# Patient Record
Sex: FEMALE | Race: WHITE | HISPANIC OR LATINO | Employment: UNEMPLOYED | URBAN - METROPOLITAN AREA
[De-identification: names, ages, dates, MRNs, and addresses within clinical notes are randomized per-mention and may not be internally consistent; named-entity substitution may affect disease eponyms.]

---

## 2021-01-08 ENCOUNTER — OFFICE VISIT (OUTPATIENT)
Dept: URGENT CARE | Facility: MEDICAL CENTER | Age: 36
End: 2021-01-08
Payer: COMMERCIAL

## 2021-01-08 VITALS
SYSTOLIC BLOOD PRESSURE: 130 MMHG | HEIGHT: 63 IN | RESPIRATION RATE: 18 BRPM | WEIGHT: 156 LBS | TEMPERATURE: 96.5 F | BODY MASS INDEX: 27.64 KG/M2 | DIASTOLIC BLOOD PRESSURE: 70 MMHG | HEART RATE: 88 BPM | OXYGEN SATURATION: 96 %

## 2021-01-08 DIAGNOSIS — Z02.4 DRIVER'S PERMIT PE (PHYSICAL EXAMINATION): Primary | ICD-10-CM

## 2021-01-08 NOTE — PROGRESS NOTES
[address]        NAME: Gely Webber is a 38 y o  female  : 1985    MRN: [id number]  DATE: 2021  TIME: 2:30 PM    Assessment and Plan   's permit PE (physical examination) [Z02 4]  1  's permit PE (physical examination)           Patient Instructions       Follow up with PCP in 3-5 days  Proceed to  ER if symptoms worsen  Chief Complaint     Chief Complaint   Patient presents with    Physical Exam     's License permit         History of Present Illness       See 's exam form  Negative history      Review of Systems   Review of Systems   All other systems reviewed and are negative  Current Medications     No current outpatient medications on file  Current Allergies     Allergies as of 2021    (No Known Allergies)            The following portions of the patient's history were reviewed and updated as appropriate: allergies, current medications, past family history, past medical history, past social history, past surgical history and problem list      History reviewed  No pertinent past medical history  History reviewed  No pertinent surgical history  History reviewed  No pertinent family history  Medications have been verified  Objective   /70   Pulse 88   Temp (!) 96 5 °F (35 8 °C) (Tympanic)   Resp 18   Ht 5' 3" (1 6 m)   Wt 70 8 kg (156 lb)   SpO2 96%   BMI 27 63 kg/m²   No LMP recorded  Physical Exam     Physical Exam  Constitutional:       Appearance: Normal appearance  She is normal weight  HENT:      Head: Normocephalic  Right Ear: Tympanic membrane normal       Left Ear: Tympanic membrane normal       Nose: Nose normal       Mouth/Throat:      Mouth: Mucous membranes are moist    Eyes:      Extraocular Movements: Extraocular movements intact  Conjunctiva/sclera: Conjunctivae normal       Pupils: Pupils are equal, round, and reactive to light     Neck:      Musculoskeletal: Normal range of motion  Cardiovascular:      Rate and Rhythm: Normal rate and regular rhythm  Pulmonary:      Effort: Pulmonary effort is normal       Breath sounds: Normal breath sounds  Abdominal:      General: Abdomen is flat  Musculoskeletal: Normal range of motion  Neurological:      General: No focal deficit present  Mental Status: She is alert and oriented to person, place, and time     Psychiatric:         Mood and Affect: Mood normal

## 2021-08-23 ENCOUNTER — OFFICE VISIT (OUTPATIENT)
Dept: FAMILY MEDICINE CLINIC | Facility: CLINIC | Age: 36
End: 2021-08-23

## 2021-08-23 VITALS
RESPIRATION RATE: 16 BRPM | OXYGEN SATURATION: 98 % | SYSTOLIC BLOOD PRESSURE: 120 MMHG | TEMPERATURE: 98 F | DIASTOLIC BLOOD PRESSURE: 60 MMHG | HEART RATE: 97 BPM | WEIGHT: 164 LBS | BODY MASS INDEX: 29.06 KG/M2 | HEIGHT: 63 IN

## 2021-08-23 DIAGNOSIS — Z11.4 SCREENING FOR HIV (HUMAN IMMUNODEFICIENCY VIRUS): ICD-10-CM

## 2021-08-23 DIAGNOSIS — Z11.3 SCREENING FOR STDS (SEXUALLY TRANSMITTED DISEASES): ICD-10-CM

## 2021-08-23 DIAGNOSIS — Z00.00 ANNUAL PHYSICAL EXAM: Primary | ICD-10-CM

## 2021-08-23 DIAGNOSIS — N30.90 CYSTITIS: ICD-10-CM

## 2021-08-23 DIAGNOSIS — R30.0 DYSURIA: ICD-10-CM

## 2021-08-23 DIAGNOSIS — Z11.59 NEED FOR HEPATITIS C SCREENING TEST: ICD-10-CM

## 2021-08-23 DIAGNOSIS — E66.3 OVERWEIGHT WITH BODY MASS INDEX (BMI) OF 29 TO 29.9 IN ADULT: ICD-10-CM

## 2021-08-23 LAB
SL AMB  POCT GLUCOSE, UA: NEGATIVE
SL AMB LEUKOCYTE ESTERASE,UA: POSITIVE
SL AMB POCT BILIRUBIN,UA: NEGATIVE
SL AMB POCT BLOOD,UA: POSITIVE
SL AMB POCT CLARITY,UA: CLEAR
SL AMB POCT COLOR,UA: YELLOW
SL AMB POCT KETONES,UA: NEGATIVE
SL AMB POCT NITRITE,UA: NEGATIVE
SL AMB POCT PH,UA: 5
SL AMB POCT SPECIFIC GRAVITY,UA: 1.02
SL AMB POCT URINE PROTEIN: NEGATIVE
SL AMB POCT UROBILINOGEN: NEGATIVE

## 2021-08-23 PROCEDURE — 87186 SC STD MICRODIL/AGAR DIL: CPT | Performed by: STUDENT IN AN ORGANIZED HEALTH CARE EDUCATION/TRAINING PROGRAM

## 2021-08-23 PROCEDURE — 87086 URINE CULTURE/COLONY COUNT: CPT | Performed by: STUDENT IN AN ORGANIZED HEALTH CARE EDUCATION/TRAINING PROGRAM

## 2021-08-23 PROCEDURE — 81002 URINALYSIS NONAUTO W/O SCOPE: CPT | Performed by: FAMILY MEDICINE

## 2021-08-23 PROCEDURE — 99385 PREV VISIT NEW AGE 18-39: CPT | Performed by: FAMILY MEDICINE

## 2021-08-23 PROCEDURE — 87077 CULTURE AEROBIC IDENTIFY: CPT | Performed by: STUDENT IN AN ORGANIZED HEALTH CARE EDUCATION/TRAINING PROGRAM

## 2021-08-23 RX ORDER — NITROFURANTOIN 25; 75 MG/1; MG/1
100 CAPSULE ORAL 2 TIMES DAILY
Qty: 10 CAPSULE | Refills: 0 | Status: SHIPPED | OUTPATIENT
Start: 2021-08-23 | End: 2021-08-28

## 2021-08-23 NOTE — PROGRESS NOTES
[facility]    NAME: Allison Quevedo  AGE: 39 y o  SEX: female  : 1985     DATE: 2021     Assessment and Plan:     Problem List Items Addressed This Visit        Genitourinary    Cystitis     Started 2 days ago   Has associated vaginal discharge, but unchanged from her regular  Denies pruritus, fever, chills, nausea, vomiting  No history of UTI  POCT urine dip positive for leukocytes and blood (LMP 2 days ago)  Will send macrobid 100 mg BID for 5 days  Follow up on Urine culture              Relevant Medications    nitrofurantoin (MACROBID) 100 mg capsule    Other Relevant Orders    Urine culture       Other    Annual physical exam - Primary     Immunizations; states she is thinking about the COVID accine  Denies family hx of DM, HTN, Denies personal/family history of colon cancer  Dental care: last seen 1 week ago  Sexually active, Monogamous, never STD testing  Denies smoking, Alcohol use, Illicit drug use  Mental health screenings: PHQ 2 score of zero, 6-7 hrs of sleep daily  Last well-woman check-Pap smear, normal, 6 months ago, next due   Last LMP  GC/chlamydia, HIV, hep C screening oredered           Relevant Orders    Ambulatory referral to Obstetrics / Gynecology      Other Visit Diagnoses     Overweight with body mass index (BMI) of 29 to 29 9 in adult        Relevant Orders    Lipid panel    TSH, 3rd generation with Free T4 reflex    Comprehensive metabolic panel    Screening for HIV (human immunodeficiency virus)        Relevant Orders    HIV 1/2 Antigen/Antibody (4th Generation) w Reflex [facility]    Need for hepatitis C screening test        Relevant Orders    Hepatitis C antibody    Screening for STDs (sexually transmitted diseases)        Relevant Orders    Chlamydia/GC amplified DNA by PCR          Immunizations and preventive care screenings were discussed with patient today  Appropriate education was printed on patient's after visit summary  Counseling:  Alcohol/drug use: discussed moderation in alcohol intake, the recommendations for healthy alcohol use, and avoidance of illicit drug use  Dental Health: discussed importance of regular tooth brushing, flossing, and dental visits  Injury prevention: discussed safety/seat belts, safety helmets, smoke detectors, carbon dioxide detectors, and smoking near bedding or upholstery  Sexual health: discussed sexually transmitted diseases, partner selection, use of condoms, avoidance of unintended pregnancy, and contraceptive alternatives  · Exercise: the importance of regular exercise/physical activity was discussed  Recommend exercise 3-5 times per week for at least 30 minutes  Return in 1 year (on 8/23/2022)  Chief Complaint:     Chief Complaint   Patient presents with    New Patient Visit      History of Present Illness:     Adult Annual Physical   Patient here for a comprehensive physical exam  The patient reports problems - Dysuria for 2 days  Diet and Physical Activity  · Diet/Nutrition: well balanced diet and limited fruits/vegetables  · Exercise: no formal exercise  Depression Screening  PHQ-9 Depression Screening    PHQ-9:   Frequency of the following problems over the past two weeks:      Little interest or pleasure in doing things: 0 - not at all  Feeling down, depressed, or hopeless: 0 - not at all  PHQ-2 Score: 0       General Health  · Sleep: gets 7-8 hours of sleep on average  · Hearing: normal - bilateral   · Vision: no vision problems  · Dental: regular dental visits  /GYN Health  · Last menstrual period: 8/21/2021  · Contraceptive method: none  · History of STDs?: no      Review of Systems:     Review of Systems   Constitutional: Negative for activity change and fever  HENT: Negative for congestion  Respiratory: Negative for cough, shortness of breath and wheezing  Cardiovascular: Negative for chest pain, palpitations and leg swelling  Gastrointestinal: Negative for abdominal pain, constipation, diarrhea, nausea and vomiting  Endocrine: Negative for polydipsia and polyuria  Genitourinary: Negative for difficulty urinating, dysuria, frequency and hematuria  Musculoskeletal: Negative for gait problem and neck pain  Skin: Negative for pallor  Neurological: Negative for dizziness  Psychiatric/Behavioral: Negative for behavioral problems  Past Medical History:     No past medical history on file  Past Surgical History:     No past surgical history on file  Social History:     Social History     Socioeconomic History    Marital status: /Civil Union     Spouse name: None    Number of children: None    Years of education: None    Highest education level: None   Occupational History    None   Tobacco Use    Smoking status: Never Smoker    Smokeless tobacco: Never Used   Substance and Sexual Activity    Alcohol use: Yes    Drug use: Never    Sexual activity: None   Other Topics Concern    None   Social History Narrative    None     Social Determinants of Health     Financial Resource Strain:     Difficulty of Paying Living Expenses:    Food Insecurity:     Worried About Running Out of Food in the Last Year:     [address] in the Last Year:    Transportation Needs:     Lack of Transportation (Medical):      Lack of Transportation (Non-Medical):    Physical Activity:     Days of Exercise per Week:     Minutes of Exercise per Session:    Stress:     Feeling of Stress :    Social Connections:     Frequency of Communication with Friends and Family:     Frequency of Social Gatherings with Friends and Family:     Attends [employer]oration[employer] Services:     Active Member of Clubs or Organizations:     Attends Club or Organization Meetings:     Marital Status:    Intimate Partner Violence:     Fear of Current or Ex-Partner:     Emotionally Abused:     Physically Abused:     Sexually Abused:       Family History:     No family history on file  Current Medications:     Current Outpatient Medications   Medication Sig Dispense Refill    nitrofurantoin (MACROBID) 100 mg capsule Take 1 capsule (100 mg total) by mouth 2 (two) times a day for 5 days 10 capsule 0     No current facility-administered medications for this visit  Allergies:     No Known Allergies   Physical Exam:     /60 (BP Location: Left arm, Patient Position: Sitting, Cuff Size: Large)   Pulse 97   Temp 98 °F (36 7 °C) (Temporal)   Resp 16   Ht 5' 3" (1 6 m)   Wt 74 4 kg (164 lb)   LMP  (LMP Unknown)   SpO2 98%   BMI 29 05 kg/m²     Physical Exam  Vitals and nursing note reviewed  Constitutional:       General: She is not in acute distress  Appearance: She is well-developed  HENT:      Head: Normocephalic and atraumatic  Eyes:      Conjunctiva/sclera: Conjunctivae normal    Cardiovascular:      Rate and Rhythm: Normal rate and regular rhythm  Heart sounds: No murmur heard  Pulmonary:      Effort: Pulmonary effort is normal  No respiratory distress  Breath sounds: Normal breath sounds  Abdominal:      Palpations: Abdomen is soft  Tenderness: There is no abdominal tenderness  Musculoskeletal:      Cervical back: Neck supple  Skin:     General: Skin is warm and dry  Neurological:      Mental Status: She is alert and oriented to person, place, and time     Psychiatric:         Behavior: Behavior normal           MD Samantha Aikendamian

## 2021-08-23 NOTE — ASSESSMENT & PLAN NOTE
Started 2 days ago   Has associated vaginal discharge, but unchanged from her regular  Denies pruritus, fever, chills, nausea, vomiting  No history of UTI  POCT urine dip positive for leukocytes and blood (LMP 2 days ago)  Will send macrobid 100 mg BID for 5 days  Follow up on Urine culture

## 2021-08-23 NOTE — ASSESSMENT & PLAN NOTE
Immunizations; states she is thinking about the COVID accine  Denies family hx of DM, HTN, Denies personal/family history of colon cancer  Dental care: last seen 1 week ago    Sexually active, Monogamous, never STD testing  Denies smoking, Alcohol use, Illicit drug use  Mental health screenings: PHQ 2 score of zero, 6-7 hrs of sleep daily  Last well-woman check-Pap smear, normal, 6 months ago, next due 2024  Last LMP  GC/chlamydia, HIV, hep C screening oredered

## 2021-08-23 NOTE — PATIENT INSTRUCTIONS

## 2021-08-25 LAB
BACTERIA UR CULT: ABNORMAL
BACTERIA UR CULT: ABNORMAL

## 2021-09-16 ENCOUNTER — OFFICE VISIT (OUTPATIENT)
Dept: OBGYN CLINIC | Facility: CLINIC | Age: 36
End: 2021-09-16

## 2021-09-16 VITALS
DIASTOLIC BLOOD PRESSURE: 79 MMHG | WEIGHT: 163 LBS | SYSTOLIC BLOOD PRESSURE: 126 MMHG | HEIGHT: 63 IN | BODY MASS INDEX: 28.88 KG/M2 | HEART RATE: 96 BPM

## 2021-09-16 DIAGNOSIS — Z00.00 ANNUAL PHYSICAL EXAM: ICD-10-CM

## 2021-09-16 PROCEDURE — G0145 SCR C/V CYTO,THINLAYER,RESCR: HCPCS

## 2021-09-16 PROCEDURE — 99214 OFFICE O/P EST MOD 30 MIN: CPT | Performed by: OBSTETRICS & GYNECOLOGY

## 2021-09-16 PROCEDURE — G0476 HPV COMBO ASSAY CA SCREEN: HCPCS

## 2021-09-16 NOTE — PROGRESS NOTES
Soumya Manzo is a 39 y o  female who presents for annual well woman exam  Periods are regular every 28-30 days, lasting 5 days  She was seen by her PCP on 8/23/21 for a physical and was referred for a gynecologic exam  She was found to have cystitis at her visit with PCP and was treated with Camella Goltz; she no longer has any symptoms  GYN:  · No vaginal discharge, labial erythema or lesions, dyspareunia  · Menses are regular, q 30 days, lasting 5-6 days  · Contraception: s/p tubal ligation  · Patient is  sexually active with one male partner  · Gynecologic surgery: b/l salpingectomy     :  · Denies  dysuria, urinary frequency or urgency  · Denies hematuria, flank pain, incontinence  Breast:  · No breast mass, skin changes, dimpling, reddening, nipple retraction  · No breast discharge  · Has not had any mammograms yet  Will start at [address] of age     General:  · Diet: balanced per pt  · Exercise: no formal exercise  · Tobacco use: never    Screening:  · Cervical cancer: last pap smear 6 months ago in [place]  Results were normal per pt  Done today  · Colon cancer: no colonoscopies done  Denies family or personal hx of colon cancer  · STD screening: ordered by PCP, pt will obtain labs    Review of Systems  Eyes: negative  Ears, nose, mouth, throat, and face: negative  Respiratory: negative  Cardiovascular: negative  Gastrointestinal: negative  Integument/breast: negative  Neurological: negative  Behavioral/Psych: negative      Objective      /79   Pulse 96   Ht 5' 3" (1 6 m)   Wt 73 9 kg (163 lb)   LMP 09/09/2021   BMI 28 87 kg/m²     Physical Exam  Constitutional:       Appearance: Normal appearance  HENT:      Head: Normocephalic  Mouth/Throat:      Mouth: Mucous membranes are moist    Eyes:      Conjunctiva/sclera: Conjunctivae normal    Cardiovascular:      Rate and Rhythm: Normal rate and regular rhythm  Pulses: Normal pulses        Heart sounds: Normal heart sounds  Pulmonary:      Effort: Pulmonary effort is normal  No respiratory distress  Breath sounds: Normal breath sounds  No wheezing or rales  Abdominal:      Palpations: Abdomen is soft  Tenderness: There is no abdominal tenderness  Genitourinary:     Vagina: No vaginal discharge  Comments: Normal external genitalia  Normal appearing cervix  Normal cervical discharge in vaginal vault  No cervical motion or adnexal tenderness  Skin:     General: Skin is warm and dry  Capillary Refill: Capillary refill takes less than 2 seconds  Neurological:      Mental Status: She is alert and oriented to person, place, and time  Mental status is at baseline  Psychiatric:         Mood and Affect: Mood normal          Behavior: Behavior normal                  Assessment     Francine Romero is a 39 y o  [id number] with normal gynecologic exam      Plan     30-39  1  Routine well woman exam done today  2   Pap and HPV:Pap with HPV was done today  Current ASCCP Guidelines reviewed  3   The patient accepted STD testing  chlamydia, gonorrhea, HIV, HPV and Hep C testing performed  Safe sex practices have been discussed  4  The patient is sexually active  S/p tubal ligation    5  The following were reviewed in today's visit: mammography screening ordered, STD testing, adequate intake of calcium and vitamin D, exercise and healthy diet    6  Patient to return to office in 12 months for annual exam

## 2021-09-20 LAB
HPV HR 12 DNA CVX QL NAA+PROBE: NEGATIVE
HPV16 DNA CVX QL NAA+PROBE: NEGATIVE
HPV18 DNA CVX QL NAA+PROBE: NEGATIVE

## 2021-09-22 LAB
LAB AP GYN PRIMARY INTERPRETATION: NORMAL
Lab: NORMAL

## 2022-09-29 ENCOUNTER — TELEPHONE (OUTPATIENT)
Dept: OBGYN CLINIC | Facility: CLINIC | Age: 37
End: 2022-09-29

## 2022-10-11 ENCOUNTER — TELEPHONE (OUTPATIENT)
Dept: OBGYN CLINIC | Facility: CLINIC | Age: 37
End: 2022-10-11

## 2023-02-17 ENCOUNTER — VBI (OUTPATIENT)
Dept: ADMINISTRATIVE | Facility: OTHER | Age: 38
End: 2023-02-17

## 2023-03-09 ENCOUNTER — OFFICE VISIT (OUTPATIENT)
Dept: FAMILY MEDICINE CLINIC | Facility: CLINIC | Age: 38
End: 2023-03-09

## 2023-03-09 VITALS
OXYGEN SATURATION: 98 % | DIASTOLIC BLOOD PRESSURE: 80 MMHG | SYSTOLIC BLOOD PRESSURE: 122 MMHG | WEIGHT: 167 LBS | RESPIRATION RATE: 16 BRPM | HEART RATE: 74 BPM | TEMPERATURE: 98.7 F | BODY MASS INDEX: 29.59 KG/M2 | HEIGHT: 63 IN

## 2023-03-09 DIAGNOSIS — K21.9 GASTROESOPHAGEAL REFLUX DISEASE WITHOUT ESOPHAGITIS: Primary | ICD-10-CM

## 2023-03-09 DIAGNOSIS — Z23 ENCOUNTER FOR VACCINATION: ICD-10-CM

## 2023-03-09 DIAGNOSIS — R14.0 BLOATING: ICD-10-CM

## 2023-03-09 PROBLEM — N30.90 CYSTITIS: Status: RESOLVED | Noted: 2021-08-23 | Resolved: 2023-03-09

## 2023-03-09 RX ORDER — FAMOTIDINE 20 MG/1
20 TABLET, FILM COATED ORAL 2 TIMES DAILY
Qty: 60 TABLET | Refills: 0 | Status: SHIPPED | OUTPATIENT
Start: 2023-03-09

## 2023-03-09 NOTE — ASSESSMENT & PLAN NOTE
Reviewed the causes of heartburn  Discussed importance of diet and lifestyle modifications to control GERD symptoms  Avoid things that worsen heartburn (ex: caffeine, tomato based products, spicy foods, tobacco, alcohol, obesity, tight fitting clothing  Discussed importance of eating small, frequent meals instead of large meals  Elevate head of the bed and do not lay down for 2-3 hours following a meal    Will obtain H-pylori test   Start famotidine 20 mg BID  Follow up in 4 weeks; but call in 2 weeks if symptoms unresolving, so that we can switch to a PPI   For now, patient does not want omeprazole but will be willing to use other PPIs

## 2023-03-09 NOTE — PATIENT INSTRUCTIONS
GERD (Gastroesophageal Reflux Disease)   WHAT YOU NEED TO KNOW:   Gastroesophageal reflux disease (GERD) is reflux that happens more than 2 times a week for a few weeks  Reflux means acid and food in your stomach back up into your esophagus  GERD can cause other health problems over time if it is not treated  DISCHARGE INSTRUCTIONS:   Call your local emergency number (911 in the [address] [facility] [facility]) if:   You have severe chest pain and sudden trouble breathing  Return to the emergency department if:   You have trouble breathing after you vomit  You have trouble swallowing, or pain with swallowing  Your bowel movements are black, bloody, or tarry-looking  Your vomit looks like coffee grounds or has blood in it  Call your doctor or gastroenterologist if:   You feel full and cannot burp or vomit  You vomit large amounts, or you vomit often  You are losing weight without trying  Your symptoms get worse or do not improve with treatment  You have questions or concerns about your condition or care  Medicines:   Medicines  are used to decrease stomach acid  Medicine may also be used to help your lower esophageal sphincter and stomach contract (tighten) more  Take your medicine as directed  Contact your healthcare provider if you think your medicine is not helping or if you have side effects  Tell your provider if you are allergic to any medicine  Keep a list of the medicines, vitamins, and herbs you take  Include the amounts, and when and why you take them  Bring the list or the pill bottles to follow-up visits  Carry your medicine list with you in case of an emergency  Manage GERD:       Do not have foods or drinks that may increase heartburn  These include chocolate, peppermint, fried or fatty foods, drinks that contain caffeine, or carbonated drinks (soda)  Other foods include spicy foods, onions, tomatoes, and tomato-based foods   Do not have foods or drinks that can irritate your esophagus, such as citrus fruits, juices, and alcohol  Do not eat large meals  When you eat a lot of food at one time, your stomach needs more acid to digest it  Eat 6 small meals each day instead of 3 large ones, and eat slowly  Do not eat meals 2 to 3 hours before bedtime  Elevate the head of your bed  Place 6-inch blocks under the head of your bed frame  You may also use more than one pillow under your head and shoulders while you sleep  Maintain a healthy weight  If you are overweight, weight loss may help relieve symptoms of GERD  Do not smoke  Smoking weakens the lower esophageal sphincter and increases the risk of GERD  Ask your healthcare provider for information if you currently smoke and need help to quit  E-cigarettes or smokeless tobacco still contain nicotine  Talk to your healthcare provider before you use these products  Do not put pressure on your abdomen  Pressure pushes acid up into your esophagus  Do not wear clothing that is tight around your waist  Do not bend over  Bend at the knees if you need to pick something up  Follow up with your doctor or gastroenterologist as directed:  Write down your questions so you remember to ask them during your visits  © Copyright [employer] Raines 2022 Information is for End User's use only and may not be sold, redistributed or otherwise used for commercial purposes  The above information is an  only  It is not intended as medical advice for individual conditions or treatments  Talk to your doctor, nurse or pharmacist before following any medical regimen to see if it is safe and effective for you

## 2023-03-09 NOTE — PROGRESS NOTES
Name: Alexis Olivier YOB: 1985      MRN: [id number]  Encounter Provider: Alf Hanson MD  Encounter Date: 3/9/2023   Encounter department: [address] [place] [address]  Gastroesophageal reflux disease without esophagitis  Assessment & Plan:  Reviewed the causes of heartburn  Discussed importance of diet and lifestyle modifications to control GERD symptoms  Avoid things that worsen heartburn (ex: caffeine, tomato based products, spicy foods, tobacco, alcohol, obesity, tight fitting clothing  Discussed importance of eating small, frequent meals instead of large meals  Elevate head of the bed and do not lay down for 2-3 hours following a meal    Will obtain H-pylori test   Start famotidine 20 mg BID  Follow up in 4 weeks; but call in 2 weeks if symptoms unresolving, so that we can switch to a PPI  For now, patient does not want omeprazole but will be willing to use other PPIs    Orders:  -     famotidine (PEPCID) 20 mg tablet; Take 1 tablet (20 mg total) by mouth 2 (two) times a day    2  Encounter for vaccination  -     Tdap Vaccine greater than or equal to 6yo    3  Bloating  -     H  pylori antigen, stool; Future      Subjective     HPI     Eyad Medrano is a 27-year-old female with no significant past medical history, who presents today with concerns for acid reflux  She reports epigastric pain ongoing for several months now, burning in nature, which radiates to the back and up to the throat, pain after eating  Worse at night and with beans, fried foods, and recently notices with her green juices in the mornings  Has occasional nausea and feeling of bloating/fullness all the time  Eats tomato based foods, drinks 1 cup of coffee/day  Has occurred in the past and was treated for gastritis in [place] before moving to [place] 3 years ago  She was using omeprazole at the time occasionally      Review of Systems  Constitutional: Negative for activity change  Respiratory: Negative for shortness of breath  Cardiovascular: Negative for chest pain, palpitations and leg swelling  Gastrointestinal: positive for abdominal pain, negative for constipation  Urinary symptoms: Negative dysuria, urinary frequency or urgency  Neurological: Negative for dizziness  Psychiatric/Behavioral: Negative for behavioral problems, dysphoric mood, hallucinations, sleep disturbance and suicidal ideas  The patient is not nervous/anxious  No past medical history on file  Past Surgical History:   Procedure Laterality Date   • TUBAL LIGATION       Family History   Problem Relation Age of Onset   • No Known Problems Mother    • No Known Problems Father    • No Known Problems Sister    • No Known Problems Brother    • No Known Problems Daughter    • No Known Problems Son      Social History     Socioeconomic History   • Marital status: /Civil Union     Spouse name: None   • Number of children: None   • Years of education: None   • Highest education level: None   Occupational History   • None   Tobacco Use   • Smoking status: Never   • Smokeless tobacco: Never   Vaping Use   • Vaping Use: Never used   Substance and Sexual Activity   • Alcohol use: Yes   • Drug use: Never   • Sexual activity: Yes     Partners: Male     Birth control/protection: None   Other Topics Concern   • None   Social History Narrative   • None     Social Determinants of Health     Financial Resource Strain: Low Risk    • Difficulty of Paying Living Expenses: Not hard at all   Food Insecurity: No Food Insecurity   • Worried About Running Out of Food in the Last Year: Never true   • Ran Out of Food in the Last Year: Never true   Transportation Needs: No Transportation Needs   • Lack of Transportation (Medical): No   • Lack of Transportation (Non-Medical):  No   Physical Activity: Not on file   Stress: Not on file   Social Connections: Not on file   Intimate Partner Violence: Not on file Housing Stability: Not on file     No current outpatient medications on file prior to visit  No Known Allergies  There is no immunization history for the selected administration types on file for this patient      Objective     /80 (BP Location: Right arm, Patient Position: Sitting, Cuff Size: Standard)   Pulse 74   Temp 98 7 °F (37 1 °C) (Temporal)   Resp 16   Ht 5' 3" (1 6 m)   Wt 75 8 kg (167 lb)   LMP 03/03/2023 (Approximate)   SpO2 98%   Breastfeeding No   BMI 29 58 kg/m²     Physical Exam  Micah Thrasher MD

## 2023-03-13 ENCOUNTER — LAB (OUTPATIENT)
Dept: LAB | Facility: HOSPITAL | Age: 38
End: 2023-03-13

## 2023-03-13 DIAGNOSIS — R14.0 BLOATING: ICD-10-CM

## 2023-03-14 LAB — H PYLORI AG STL QL IA: NEGATIVE

## 2023-04-10 ENCOUNTER — APPOINTMENT (OUTPATIENT)
Dept: URGENT CARE | Age: 38
End: 2023-04-10

## 2023-04-10 ENCOUNTER — APPOINTMENT (OUTPATIENT)
Dept: LAB | Age: 38
End: 2023-04-10

## 2023-04-10 DIAGNOSIS — Z02.1 PHYSICAL EXAM, PRE-EMPLOYMENT: ICD-10-CM

## 2023-04-10 DIAGNOSIS — Z02.1 PHYSICAL EXAM, PRE-EMPLOYMENT: Primary | ICD-10-CM

## 2023-04-12 LAB
GAMMA INTERFERON BACKGROUND BLD IA-ACNC: 0.08 IU/ML
M TB IFN-G BLD-IMP: POSITIVE
M TB IFN-G CD4+ BCKGRND COR BLD-ACNC: 0.38 IU/ML
M TB IFN-G CD4+ BCKGRND COR BLD-ACNC: 0.44 IU/ML
MITOGEN IGNF BCKGRD COR BLD-ACNC: 8.09 IU/ML

## 2023-04-14 PROBLEM — J30.9 ALLERGIC RHINITIS: Status: ACTIVE | Noted: 2023-04-14

## 2023-05-01 ENCOUNTER — OFFICE VISIT (OUTPATIENT)
Dept: GASTROENTEROLOGY | Facility: CLINIC | Age: 38
End: 2023-05-01

## 2023-05-01 VITALS
HEART RATE: 81 BPM | DIASTOLIC BLOOD PRESSURE: 55 MMHG | TEMPERATURE: 98 F | WEIGHT: 167.2 LBS | HEIGHT: 64 IN | OXYGEN SATURATION: 98 % | BODY MASS INDEX: 28.54 KG/M2 | SYSTOLIC BLOOD PRESSURE: 116 MMHG

## 2023-05-01 DIAGNOSIS — H93.11 TINNITUS OF RIGHT EAR: ICD-10-CM

## 2023-05-01 DIAGNOSIS — K21.9 GASTROESOPHAGEAL REFLUX DISEASE WITHOUT ESOPHAGITIS: Primary | ICD-10-CM

## 2023-05-01 DIAGNOSIS — K59.09 OTHER CONSTIPATION: ICD-10-CM

## 2023-05-01 DIAGNOSIS — R10.13 EPIGASTRIC PAIN: ICD-10-CM

## 2023-05-01 RX ORDER — POLYETHYLENE GLYCOL 3350 17 G/17G
17 POWDER, FOR SOLUTION ORAL DAILY
Qty: 30 EACH | Refills: 1 | Status: SHIPPED | OUTPATIENT
Start: 2023-05-01 | End: 2023-05-31

## 2023-05-01 RX ORDER — PANTOPRAZOLE SODIUM 40 MG/1
40 TABLET, DELAYED RELEASE ORAL 2 TIMES DAILY
Qty: 120 TABLET | Refills: 0 | Status: SHIPPED | OUTPATIENT
Start: 2023-05-01 | End: 2023-06-30

## 2023-05-01 NOTE — PROGRESS NOTES
[facility] - Outpatient Consultation  Michael Freire 44 y o  female MRN: [id number]  Encounter: [id number]        ASSESSMENT AND PLAN     1  Gastroesophageal reflux disease without esophagitis  2  Epigastric pain  We will obtain EGD given her worsening symptoms, not responsive to PPI daily  We will also increase the dose of PPI to twice daily and assess response  Recommended that if the twice daily PPI does help she should call us and cancel the appointment for EGD   - CBC and Platelet; Future  - TSH, 3rd generation with Free T4 reflex; Future  - Comprehensive metabolic panel; Future  - US right upper quadrant; Future  - pantoprazole (PROTONIX) 40 mg tablet; Take 1 tablet (40 mg total) by mouth 2 (two) times a day  Dispense: 120 tablet; Refill: 0  - EGD; Future    3  Other constipation  Will recommend starting MiraLAX daily, she has not tried any medications for this as of yet  Will assess response during a follow-up visit  - CBC and Platelet; Future  - TSH, 3rd generation with Free T4 reflex; Future  - Comprehensive metabolic panel; Future  - Start taking fiber supplementation (Metamucil, Citrucel, Benefiber, etc)  Please mix one heaping tablespoon with an 8 ounce glass of water and drink every morning  This can be increased to twice per day if needed  Fiber tablets and gummies will also work  The goal is to have regular, formed, and easy-to-pass bowel movements  The effect is gradual, so please use it consistently for at least 2 weeks  Stay well-hydrated to avoid constipation  FUP after EGD      Orders Placed This Encounter   Procedures    US right upper quadrant    CBC and Platelet    TSH, 3rd generation with Free T4 reflex    Comprehensive metabolic panel    EGD       HISTORY OF PRESENT ILLNESS     HPI     28-year-old female with past medical history including but not limited to chronic GERD symptoms, constipation who presents today upon referral from primary care provider for ongoing symptoms  Reports that she has been dealing with on and off abdominal pain, reflux for about 10 years  Previously was seen in [place] by GI team, had an endoscopy which she reports had stomach ulcers  Since then she has followed with GI on and off  Last saw GI team at [facility] about 4 years ago and she was maintained on omeprazole which was helping only minimally  She was recently seen by Joseph [facility]enzie primary care provider and was started on pantoprazole after being tested for stool antigen for H  pylori which was negative  Reports that the pantoprazole has been helping her symptoms better than the omeprazole  She mainly describes her symptoms as epigastric pain which does not change in relation to the food intake  Present throughout the day but mostly in the morning after waking up  Has tried some home remedy and dietary modifications which helped minimally  Along with this she also complains of constipation ongoing for very long time  Currently she describes her bowel habits as hard, with straining every 3 to 4 days  She denies any smoking, alcohol use  Denies any family history concerning for GI related disorders  REVIEW OF SYSTEMS     CONSTITUTIONAL: Denies any fever, chills, rigors, and weight loss  HEENT: No earache or tinnitus  Denies hearing loss or visual disturbances  CARDIOVASCULAR: No chest pain or palpitations  RESPIRATORY: Denies any cough, hemoptysis, shortness of breath or dyspnea on exertion  GASTROINTESTINAL: As noted in the History of Present Illness  GENITOURINARY: No problems with urination  Denies any hematuria or dysuria  NEUROLOGIC: No dizziness or vertigo, denies headaches  MUSCULOSKELETAL: Denies any muscle or joint pain  SKIN: Denies skin rashes or itching  ENDOCRINE: Denies excessive thirst  Denies intolerance to heat or cold  PSYCHOSOCIAL: Denies depression or anxiety  Denies any recent memory loss  "      Historical information   History reviewed  No pertinent past medical history  Past Surgical History:   Procedure Laterality Date    TUBAL LIGATION       Social History   Social History     Substance and Sexual Activity   Alcohol Use Yes     Social History     Substance and Sexual Activity   Drug Use Never     Social History     Tobacco Use   Smoking Status Never   Smokeless Tobacco Never     Family History   Problem Relation Age of Onset    No Known Problems Mother     No Known Problems Father     No Known Problems Sister     No Known Problems Brother     No Known Problems Daughter     No Known Problems Son        Allergies       Current Outpatient Medications:     clotrimazole (LOTRIMIN) 1 % cream    fexofenadine (ALLEGRA) 180 MG tablet    fluticasone (FLONASE) 50 mcg/act nasal spray    pantoprazole (PROTONIX) 40 mg tablet    polyethylene glycol (MIRALAX) 17 g packet    No Known Allergies        Objective assessment       Blood pressure 116/55, pulse 81, temperature 98 °F (36 7 °C), temperature source Tympanic, height 5' 4\" (1 626 m), weight 75 8 kg (167 lb 3 2 oz), last menstrual period 04/05/2023, SpO2 98 %, not currently breastfeeding  Body mass index is 28 7 kg/m²  PHYSICAL EXAM:         General Appearance:   Alert, cooperative, no distress   HEENT:   Normocephalic     Neck:  symmetrical   Lungs:   Breathing unlabored, able to speak in full sentences    Heart[de-identified]   Regular rate    Abdomen:   Non distended   Genitalia:   Deferred    Rectal:   Deferred    Extremities:  No cyanosis, or edema    Pulses:  Strong   Skin:  No jaundice, rashes, or lesions    Lymph nodes:  No palpable cervical lymphadenopathy        Lab Results:      No visits with results within 1 Day(s) from this visit     Latest known visit with results is:   Appointment on 04/10/2023   Component Date Value    QFT Nil 04/10/2023 0 08     QFT TB1-NIL 04/10/2023 0 38     QFT TB2-NIL 04/10/2023 0 44     QFT Mitogen-NIL " 04/10/2023 8 09     QFT Final Interpretation 04/10/2023 Positive (A)          Radiology Results:      No results found        Camille Thrasher MD  [facility] Fellow

## 2023-05-01 NOTE — PATIENT INSTRUCTIONS
Scheduled date of egd (as of today) 06/29/2023  Physician performing: Dr Jozef Sifuentes  Location of procedure:  [facility]  Instructions given to patient:  EGD  Clearances: n/a

## 2023-05-08 ENCOUNTER — TELEPHONE (OUTPATIENT)
Dept: OTHER | Facility: OTHER | Age: 38
End: 2023-05-08

## 2023-05-08 NOTE — TELEPHONE ENCOUNTER
Patient called stating she went to her pharmacy to  her prescriptions, but she was told that her Medicare did not approve her medications  Patient is wondering if an alternative could be sent  Please call patient back to discuss

## 2023-05-09 ENCOUNTER — LAB (OUTPATIENT)
Dept: LAB | Facility: HOSPITAL | Age: 38
End: 2023-05-09

## 2023-05-09 ENCOUNTER — HOSPITAL ENCOUNTER (OUTPATIENT)
Dept: ULTRASOUND IMAGING | Facility: HOSPITAL | Age: 38
Discharge: HOME | End: 2023-05-09

## 2023-05-09 DIAGNOSIS — K59.09 OTHER CONSTIPATION: Primary | ICD-10-CM

## 2023-05-09 DIAGNOSIS — R10.13 EPIGASTRIC PAIN: ICD-10-CM

## 2023-05-09 DIAGNOSIS — K21.9 GASTROESOPHAGEAL REFLUX DISEASE WITHOUT ESOPHAGITIS: ICD-10-CM

## 2023-05-09 DIAGNOSIS — J30.9 ALLERGIC RHINITIS, UNSPECIFIED SEASONALITY, UNSPECIFIED TRIGGER: ICD-10-CM

## 2023-05-09 DIAGNOSIS — K59.09 OTHER CONSTIPATION: ICD-10-CM

## 2023-05-09 LAB
ALBUMIN SERPL BCP-MCNC: 4.3 G/DL (ref 3.5–5)
ALP SERPL-CCNC: 42 U/L (ref 34–104)
ALT SERPL W P-5'-P-CCNC: 22 U/L (ref 7–52)
ANION GAP SERPL CALCULATED.3IONS-SCNC: 8 MMOL/L (ref 4–13)
AST SERPL W P-5'-P-CCNC: 21 U/L (ref 13–39)
BILIRUB SERPL-MCNC: 0.48 MG/DL (ref 0.2–1)
BUN SERPL-MCNC: 13 MG/DL (ref 5–25)
CALCIUM SERPL-MCNC: 9 MG/DL (ref 8.4–10.2)
CHLORIDE SERPL-SCNC: 105 MMOL/L (ref 96–108)
CO2 SERPL-SCNC: 26 MMOL/L (ref 21–32)
CREAT SERPL-MCNC: 0.81 MG/DL (ref 0.6–1.3)
ERYTHROCYTE [DISTWIDTH] IN BLOOD BY AUTOMATED COUNT: 14.1 % (ref 11.6–15.1)
GFR SERPL CREATININE-BSD FRML MDRD: 91 ML/MIN/1.73SQ M
GLUCOSE P FAST SERPL-MCNC: 98 MG/DL (ref 65–99)
HCT VFR BLD AUTO: 37.9 % (ref 34.8–46.1)
HGB BLD-MCNC: 11.5 G/DL (ref 11.5–15.4)
MCH RBC QN AUTO: 26.2 PG (ref 26.8–34.3)
MCHC RBC AUTO-ENTMCNC: 30.3 G/DL (ref 31.4–37.4)
MCV RBC AUTO: 86 FL (ref 82–98)
PLATELET # BLD AUTO: 228 THOUSANDS/UL (ref 149–390)
PMV BLD AUTO: 11.7 FL (ref 8.9–12.7)
POTASSIUM SERPL-SCNC: 4 MMOL/L (ref 3.5–5.3)
PROT SERPL-MCNC: 7.1 G/DL (ref 6.4–8.4)
RBC # BLD AUTO: 4.39 MILLION/UL (ref 3.81–5.12)
SODIUM SERPL-SCNC: 139 MMOL/L (ref 135–147)
TSH SERPL DL<=0.05 MIU/L-ACNC: 1.89 UIU/ML (ref 0.45–4.5)
WBC # BLD AUTO: 6.93 THOUSAND/UL (ref 4.31–10.16)

## 2023-05-09 RX ORDER — POLYETHYLENE GLYCOL 3350 17 G/17G
17 POWDER, FOR SOLUTION ORAL DAILY
Qty: 255 G | Refills: 2 | Status: SHIPPED | OUTPATIENT
Start: 2023-05-09

## 2023-05-09 NOTE — TELEPHONE ENCOUNTER
Called pt and left vm informing pt of what was discussed with pharmacy and relayed message about pantoprazole being able to be picked up today, however, miralax will need a new order for bottle to be covered  Provided our number and instructed to call for me for further assistance

## 2023-05-09 NOTE — TELEPHONE ENCOUNTER
Spoke with patient and she stated medication not covered by medical assistance insurance  I informed pt I will contact pharmacy to further discuss

## 2023-05-09 NOTE — TELEPHONE ENCOUNTER
Returned phone call to pt and I explained to her medications are ready to be picked up  Explained miralax had to be reordered and should be ready to  as well  Pt verbalized understanding

## 2023-05-09 NOTE — TELEPHONE ENCOUNTER
Contacted pharmacy regarding pt's medication being rejected by insurance and was able to connect with pharmacy  Per pharmacist, the pantoprazole was too soon to be filled at the time and miralax packet not covered because insurance has bottle as formulary medication  The pharmacy informed pt can  pantoprazole, however the miralax needs  to have new order submitted for bottle instead of packet

## 2023-05-12 LAB

## 2023-05-12 NOTE — RESULT ENCOUNTER NOTE
I called and spoke to the spouse regarding the results of CBC, CMP, TSH  He said he can take the msg and relay the info to the patient  Discussed that everything appered normal  Encouraged to obtain endoscopy which is already scheduled  He expressed gratitude and understanding

## 2023-06-07 DIAGNOSIS — R76.12 POSITIVE QUANTIFERON-TB GOLD TEST: Primary | ICD-10-CM

## 2023-06-17 DIAGNOSIS — R76.12 POSITIVE QUANTIFERON-TB GOLD TEST: Primary | ICD-10-CM

## 2023-06-29 ENCOUNTER — HOSPITAL ENCOUNTER (OUTPATIENT)
Dept: GASTROENTEROLOGY | Facility: HOSPITAL | Age: 38
Setting detail: OUTPATIENT SURGERY
End: 2023-06-29
Attending: INTERNAL MEDICINE | Admitting: INTERNAL MEDICINE
Payer: COMMERCIAL

## 2023-06-29 ENCOUNTER — ANESTHESIA (OUTPATIENT)
Dept: GASTROENTEROLOGY | Facility: HOSPITAL | Age: 38
End: 2023-06-29

## 2023-06-29 ENCOUNTER — ANESTHESIA EVENT (OUTPATIENT)
Dept: GASTROENTEROLOGY | Facility: HOSPITAL | Age: 38
End: 2023-06-29

## 2023-06-29 VITALS
OXYGEN SATURATION: 99 % | RESPIRATION RATE: 18 BRPM | BODY MASS INDEX: 28.51 KG/M2 | DIASTOLIC BLOOD PRESSURE: 73 MMHG | HEIGHT: 64 IN | SYSTOLIC BLOOD PRESSURE: 118 MMHG | WEIGHT: 167 LBS | TEMPERATURE: 97.5 F | HEART RATE: 75 BPM

## 2023-06-29 DIAGNOSIS — R10.13 EPIGASTRIC PAIN: ICD-10-CM

## 2023-06-29 DIAGNOSIS — K21.9 GASTROESOPHAGEAL REFLUX DISEASE WITHOUT ESOPHAGITIS: ICD-10-CM

## 2023-06-29 PROBLEM — N64.4 MASTALGIA IN FEMALE: Status: ACTIVE | Noted: 2022-06-08

## 2023-06-29 PROBLEM — N75.0 BARTHOLIN'S GLAND CYST: Status: ACTIVE | Noted: 2022-07-01

## 2023-06-29 PROBLEM — N76.1 SUBACUTE VAGINITIS: Status: ACTIVE | Noted: 2022-04-28

## 2023-06-29 LAB
EXT PREGNANCY TEST URINE: NEGATIVE
EXT. CONTROL: NORMAL

## 2023-06-29 PROCEDURE — 88305 TISSUE EXAM BY PATHOLOGIST: CPT | Performed by: PATHOLOGY

## 2023-06-29 PROCEDURE — 81025 URINE PREGNANCY TEST: CPT | Performed by: STUDENT IN AN ORGANIZED HEALTH CARE EDUCATION/TRAINING PROGRAM

## 2023-06-29 PROCEDURE — 88342 IMHCHEM/IMCYTCHM 1ST ANTB: CPT | Performed by: PATHOLOGY

## 2023-06-29 PROCEDURE — 88313 SPECIAL STAINS GROUP 2: CPT | Performed by: PATHOLOGY

## 2023-06-29 PROCEDURE — 43239 EGD BIOPSY SINGLE/MULTIPLE: CPT | Performed by: INTERNAL MEDICINE

## 2023-06-29 PROCEDURE — 88341 IMHCHEM/IMCYTCHM EA ADD ANTB: CPT | Performed by: PATHOLOGY

## 2023-06-29 RX ORDER — LIDOCAINE HYDROCHLORIDE 10 MG/ML
INJECTION, SOLUTION EPIDURAL; INFILTRATION; INTRACAUDAL; PERINEURAL AS NEEDED
Status: DISCONTINUED | OUTPATIENT
Start: 2023-06-29 | End: 2023-06-29

## 2023-06-29 RX ORDER — SODIUM CHLORIDE, SODIUM LACTATE, POTASSIUM CHLORIDE, CALCIUM CHLORIDE 600; 310; 30; 20 MG/100ML; MG/100ML; MG/100ML; MG/100ML
INJECTION, SOLUTION INTRAVENOUS CONTINUOUS PRN
Status: DISCONTINUED | OUTPATIENT
Start: 2023-06-29 | End: 2023-06-29

## 2023-06-29 RX ORDER — PROPOFOL 10 MG/ML
INJECTION, EMULSION INTRAVENOUS CONTINUOUS PRN
Status: DISCONTINUED | OUTPATIENT
Start: 2023-06-29 | End: 2023-06-29

## 2023-06-29 RX ORDER — PROPOFOL 10 MG/ML
INJECTION, EMULSION INTRAVENOUS AS NEEDED
Status: DISCONTINUED | OUTPATIENT
Start: 2023-06-29 | End: 2023-06-29

## 2023-06-29 RX ADMIN — PROPOFOL 140 MCG/KG/MIN: 10 INJECTION, EMULSION INTRAVENOUS at 11:27

## 2023-06-29 RX ADMIN — SODIUM CHLORIDE, SODIUM LACTATE, POTASSIUM CHLORIDE, AND CALCIUM CHLORIDE: .6; .31; .03; .02 INJECTION, SOLUTION INTRAVENOUS at 11:14

## 2023-06-29 RX ADMIN — PROPOFOL 120 MG: 10 INJECTION, EMULSION INTRAVENOUS at 11:27

## 2023-06-29 RX ADMIN — LIDOCAINE HYDROCHLORIDE 100 MG: 10 INJECTION, SOLUTION EPIDURAL; INFILTRATION; INTRACAUDAL at 11:25

## 2023-06-29 NOTE — ANESTHESIA POSTPROCEDURE EVALUATION
Post-Op Assessment Note    CV Status:  Stable  Pain Score: 0    Pain management: adequate     Mental Status:  Alert and awake   Hydration Status:  Euvolemic   PONV Controlled:  None   Airway Patency:  Patent      Post Op Vitals Reviewed: Yes      Staff: CRNA         There were no known notable events for this encounter      BP  118/76    Temp  97 5    Pulse  91   Resp   18   SpO2  98

## 2023-06-29 NOTE — ANESTHESIA PREPROCEDURE EVALUATION
Procedure:  EGD    Relevant Problems   CARDIO   (+) Mastalgia in female      GI/HEPATIC   (+) Gastroesophageal reflux disease without esophagitis      Respiratory   (+) Allergic rhinitis      Genitourinary   (+) Bartholin's gland cyst      Lab Results   Component Value Date    SODIUM 139 05/09/2023    K 4 0 05/09/2023     05/09/2023    CO2 26 05/09/2023    AGAP 8 05/09/2023    BUN 13 05/09/2023    CREATININE 0 81 05/09/2023    GLUF 98 05/09/2023    CALCIUM 9 0 05/09/2023    AST 21 05/09/2023    ALT 22 05/09/2023    ALKPHOS 42 05/09/2023    TP 7 1 05/09/2023    TBILI 0 48 05/09/2023    EGFR 91 05/09/2023     Lab Results   Component Value Date    WBC 6 93 05/09/2023    HGB 11 5 05/09/2023    HCT 37 9 05/09/2023    MCV 86 05/09/2023     05/09/2023            Anesthesia Plan  ASA Score- 2     Anesthesia Type- IV sedation with anesthesia with ASA Monitors  Additional Monitors:   Airway Plan:           Plan Factors-Exercise tolerance (METS): >4 METS  Chart reviewed  EKG reviewed  Imaging results reviewed  Existing labs reviewed  Patient summary reviewed  Induction- intravenous  Postoperative Plan-     Informed Consent- Anesthetic plan and risks discussed with patient  I personally reviewed this patient with the CRNA  Discussed and agreed on the Anesthesia Plan with the CRNA  Kaylynn Celaya

## 2023-07-02 PROCEDURE — 88313 SPECIAL STAINS GROUP 2: CPT | Performed by: PATHOLOGY

## 2023-07-02 PROCEDURE — 88342 IMHCHEM/IMCYTCHM 1ST ANTB: CPT | Performed by: PATHOLOGY

## 2023-07-02 PROCEDURE — 88305 TISSUE EXAM BY PATHOLOGIST: CPT | Performed by: PATHOLOGY

## 2023-07-02 PROCEDURE — 88341 IMHCHEM/IMCYTCHM EA ADD ANTB: CPT | Performed by: PATHOLOGY

## 2023-09-22 ENCOUNTER — VBI (OUTPATIENT)
Dept: ADMINISTRATIVE | Facility: OTHER | Age: 38
End: 2023-09-22

## 2023-09-22 NOTE — TELEPHONE ENCOUNTER
09/22/23 8:22 AM     VB [employer] SmartForm used to document caregap status.     [facility]

## 2024-02-23 ENCOUNTER — TELEPHONE (OUTPATIENT)
Dept: FAMILY MEDICINE CLINIC | Facility: CLINIC | Age: 39
End: 2024-02-23

## 2024-06-03 ENCOUNTER — OFFICE VISIT (OUTPATIENT)
Dept: GASTROENTEROLOGY | Facility: CLINIC | Age: 39
End: 2024-06-03
Payer: COMMERCIAL

## 2024-06-03 VITALS
DIASTOLIC BLOOD PRESSURE: 75 MMHG | OXYGEN SATURATION: 97 % | BODY MASS INDEX: 29.59 KG/M2 | WEIGHT: 173.3 LBS | HEART RATE: 93 BPM | HEIGHT: 64 IN | TEMPERATURE: 97.1 F | SYSTOLIC BLOOD PRESSURE: 116 MMHG

## 2024-06-03 DIAGNOSIS — R10.13 EPIGASTRIC PAIN: Primary | ICD-10-CM

## 2024-06-03 DIAGNOSIS — R14.0 BLOATING: ICD-10-CM

## 2024-06-03 PROCEDURE — 99214 OFFICE O/P EST MOD 30 MIN: CPT | Performed by: INTERNAL MEDICINE

## 2024-06-03 RX ORDER — DICYCLOMINE HYDROCHLORIDE 10 MG/1
10 CAPSULE ORAL 3 TIMES DAILY PRN
Qty: 30 CAPSULE | Refills: 0 | Status: SHIPPED | OUTPATIENT
Start: 2024-06-03 | End: 2024-07-03

## 2024-06-03 NOTE — PROGRESS NOTES
[facility] - Outpatient Follow-up Note  Ricci Wheeler 39 y.o. female MRN: [id number]  Encounter: [id number]          ASSESSMENT AND PLAN:      Left sided abdominal pain  Bloating    Workup thus far including lipase, CMP, CBC, EGD with gastric biopsies (2023), at least 2 cross-sectional imaging (2024) have been unrevealing.  RUQ ultrasound done last year was also unremarkable.  She does not have any significant postprandial early satiety, nausea or vomiting as well as no risk factors to suspect gastroparesis. No risk factors to suspect SIBO.    Physical exam is unremarkable, no ideas of tenderness, swelling, skin irritation noted.     Suspicion is for irritable bowel syndrome versus functional pain versus dyspepsia.  PPI did help in the past however is not on it currently.    Check stool antigen for H. pylori  As needed dicyclomine for pain  Will consider trial of PPI versus amitriptyline if pain continues  Can consider testing for SIBO but would likely be low yield    Follow-up in 6 months or sooner as needed    There are no diagnoses linked to this encounter.  ______________________________________________________________________    SUBJECTIVE:      Interpretation provided by daughter as per patient request.     Patient presents today for follow-up.  Patient last seen in GI clinic on 5/1/2023 for epigastric abdominal pain, constipation.     39-year-old female with no significant past medical history who presents today for follow-up of chronic GI complaints.  Previously self-reported history of gastric ulcers diagnosed in [facility] by GI team.  Given her ongoing worsening epigastric pain, GERD had recommended an EGD which was performed on 6/29/2023 which showed mild gastritis but otherwise normal findings.  On biopsy benign findings, mild gastritis and no bacterial infection.  She was also started on MiraLAX to help her with the constipation, was recommended follow-up after  "the EGD however she had not presented until today.     Had blood work done recently on 5/7/2024 which showed normal hemoglobin, CBC, CMP during [facility] ER visit for lump under left breast.  She had CT abdomen with contrast done at the same time which showed findings compatible with vijayamerz.  Was also seen at [facility] ER on 2/16/2024 for difficulty with urination as well as upper abdominal pain. Blood work at that time including CBC, lipase and CMP was largely unremarkable.  CT abdomen with contrast at that time was also negative for any intra-abdominal pathologies.    On my encounter today she reports almost daily band like left sided abdominal pain. Occurs daily, associated with subjective sensation of bloating, swelling and inflammation. Not changes related to diet intake, diarrhea, constipation, menses, body movement. She denies any rectal bleeding, weight loss, dysphagia, worsening reflux, vomiting, nausea.     REVIEW OF SYSTEMS IS OTHERWISE NEGATIVE.      Historical Information   History reviewed. No pertinent past medical history.  Past Surgical History:   Procedure Laterality Date    TUBAL LIGATION       Social History   Social History     Substance and Sexual Activity   Alcohol Use Yes     Social History     Substance and Sexual Activity   Drug Use Never     Social History     Tobacco Use   Smoking Status Never   Smokeless Tobacco Never     Family History   Problem Relation Age of Onset    No Known Problems Mother     No Known Problems Father     No Known Problems Sister     No Known Problems Brother     No Known Problems Daughter     No Known Problems Son        Meds/Allergies       Current Outpatient Medications:     dicyclomine (BENTYL) 10 mg capsule    pantoprazole (PROTONIX) 40 mg tablet    No Known Allergies    Objective     Blood pressure 116/75, pulse 93, temperature (!) 97.1 °F (36.2 °C), temperature source Tympanic, height 5' 4\" (1.626 m), weight 78.6 kg (173 lb 4.8 oz), SpO2 97%, not currently " breastfeeding. Body mass index is 29.75 kg/m².      PHYSICAL EXAM:  Performed with chaperone Dr Yusuf    General Appearance:   Alert, cooperative, no distress   HEENT:   Normocephalic    Respi:   No labored breathing, able to speak in full sentences   Heart::   Normal rate on pulse palpation   Abdomen:   No overlying erythema, areas of tenderness   Genitalia:   Deferred    Rectal:   Deferred    Extremities:  No cyanosis, clubbing or edema    Pulses:  2+ and symmetric    Skin:  No jaundice   Lymph nodes:  No palpable cervical lymphadenopathy        Lab Results:   No visits with results within 1 Day(s) from this visit.   Latest known visit with results is:   Hospital Outpatient Visit on 06/29/2023   Component Date Value    EXT Preg Test, Ur 06/29/2023 Negative     Control 06/29/2023 Valid     Case Report 06/29/2023                      Value:Surgical Pathology Report                         Case: [id number]                                   Authorizing Provider:  Dario Miller MD              Collected:           06/29/2023 1131              Ordering Location:     [facility] Received:            06/29/2023 1429                                     Heart Endoscopy                                                              Pathologist:           Tanesha Simmons MD                                                                    Specimens:   A) - Duodenum, r/o celiac                                                                           B) - Stomach, erosion, r/o h.pylori                                                        Final Diagnosis 06/29/2023                      Value:This result contains rich text formatting which cannot be displayed here.    Additional Information 06/29/2023                      Value:This result contains rich text formatting which cannot be displayed here.    Gross Description 06/29/2023                      Value:This result contains rich text formatting which cannot be displayed  here.    Clinical Information 06/29/2023                      Value:Per EGD,  INDICATION:  Epigastric pain, Gastroesophageal reflux disease without esophagitis      FINDINGS:  · Regular Z-line  · The esophagus appeared normal.  · Mild abnormal mucosa with erosion in the antrum; performed cold forceps biopsy to rule out H. pylori  · The duodenum appeared normal. Performed random biopsy using biopsy forceps.           Radiology Results:   CTA abdomen pelvis w wo contrast    Result Date: 5/7/2024  Narrative: CT abdomen pelvis enhanced INDICATION: Left upper quadrant pain and swelling LMP not recorded PROCEDURE: Helical sections through the abdomen pelvis with 85 ml Omnipaque 350 intravenous contrast COMPARISON: 4/27/2024 FINDINGS: The lung bases are clear. No pleural or pericardial effusion. Liver gallbladder biliary tree pancreas and spleen are all unremarkable No renal or adrenal lesion or hydronephrosis. Urinary bladder moderately distended. Anteverted uterus and ovaries are not enlarged; collapsed follicle left with adjacent fluid. Retroperitoneum otherwise unremarkable Nondilated bowel. Formed stool in the colon and rectum. No free air or external hernia. Bony structures unremarkable    Impression: IMPRESSION: Findings are compatible with [facility] Workstation:[id number]    POCT US SOFT TISSUE    Result Date: 5/7/2024  Narrative: This procedure was performed by the ordering provider. It has not been reviewed by a radiologist and the results are available in the provider note.      Kal Mckinnon MD  Fellow  Gastroenterology  [facility]

## 2024-06-13 ENCOUNTER — APPOINTMENT (OUTPATIENT)
Dept: LAB | Facility: CLINIC | Age: 39
End: 2024-06-13
Payer: COMMERCIAL

## 2024-06-13 DIAGNOSIS — R14.0 BLOATING: ICD-10-CM

## 2024-06-13 DIAGNOSIS — R10.13 EPIGASTRIC PAIN: ICD-10-CM

## 2024-06-13 PROCEDURE — 87338 HPYLORI STOOL AG IA: CPT

## 2024-06-14 LAB — H PYLORI AG STL QL IA: NEGATIVE

## 2024-06-17 NOTE — RESULT ENCOUNTER NOTE
Markus,  La prueba de infección en heces fue negativa. Por favor, avíseme si el medicamento que comenzamos le manley ayudado.    Hi,  Stool infection test was negative. Please let me know if the medication we started has been helping.     Sent MCM as above.

## 2024-06-19 ENCOUNTER — TELEPHONE (OUTPATIENT)
Dept: GASTROENTEROLOGY | Facility: CLINIC | Age: 39
End: 2024-06-19

## 2024-06-19 NOTE — TELEPHONE ENCOUNTER
----- Message from Kal Mckinnon MD sent at 6/17/2024  5:10 PM EDT -----  Markus,  La prueba de infección en heces fue negativa. Por favor, avíseme si el medicamento que comenzamos le manley ayudado.    Hi,  Stool infection test was negative. Please let me know if the medication we started has been helping.     Sent MCM as above.

## 2024-06-19 NOTE — TELEPHONE ENCOUNTER
Spoke with pt with  id# [phone] and have related results from provider.  Pt has verbalized and understood results, pt also states that script that was sent over has helped and pt starting to feel much better, will reach out if anything changes.

## 2024-08-28 ENCOUNTER — APPOINTMENT (OUTPATIENT)
Dept: LAB | Facility: CLINIC | Age: 39
End: 2024-08-28
Payer: COMMERCIAL

## 2024-08-28 DIAGNOSIS — Z11.59 SCREENING EXAMINATION FOR POLIOMYELITIS: ICD-10-CM

## 2024-08-28 DIAGNOSIS — D64.9 ANEMIA, UNSPECIFIED TYPE: ICD-10-CM

## 2024-08-28 DIAGNOSIS — Z13.6 SCREENING FOR ISCHEMIC HEART DISEASE: ICD-10-CM

## 2024-08-28 DIAGNOSIS — Z11.4 SCREENING FOR HUMAN IMMUNODEFICIENCY VIRUS: ICD-10-CM

## 2024-08-28 LAB
BASOPHILS # BLD AUTO: 0.03 THOUSANDS/ÂΜL (ref 0–0.1)
BASOPHILS NFR BLD AUTO: 1 % (ref 0–1)
CHOLEST SERPL-MCNC: 198 MG/DL
EOSINOPHIL # BLD AUTO: 0.23 THOUSAND/ÂΜL (ref 0–0.61)
EOSINOPHIL NFR BLD AUTO: 4 % (ref 0–6)
ERYTHROCYTE [DISTWIDTH] IN BLOOD BY AUTOMATED COUNT: 15.4 % (ref 11.6–15.1)
FERRITIN SERPL-MCNC: 11 NG/ML (ref 11–307)
HCT VFR BLD AUTO: 38 % (ref 34.8–46.1)
HDLC SERPL-MCNC: 48 MG/DL
HGB BLD-MCNC: 11.6 G/DL (ref 11.5–15.4)
IMM GRANULOCYTES # BLD AUTO: 0.02 THOUSAND/UL (ref 0–0.2)
IMM GRANULOCYTES NFR BLD AUTO: 0 % (ref 0–2)
LDLC SERPL CALC-MCNC: 138 MG/DL (ref 0–100)
LYMPHOCYTES # BLD AUTO: 1.38 THOUSANDS/ÂΜL (ref 0.6–4.47)
LYMPHOCYTES NFR BLD AUTO: 21 % (ref 14–44)
MCH RBC QN AUTO: 25.9 PG (ref 26.8–34.3)
MCHC RBC AUTO-ENTMCNC: 30.5 G/DL (ref 31.4–37.4)
MCV RBC AUTO: 85 FL (ref 82–98)
MONOCYTES # BLD AUTO: 0.7 THOUSAND/ÂΜL (ref 0.17–1.22)
MONOCYTES NFR BLD AUTO: 11 % (ref 4–12)
NEUTROPHILS # BLD AUTO: 4.23 THOUSANDS/ÂΜL (ref 1.85–7.62)
NEUTS SEG NFR BLD AUTO: 63 % (ref 43–75)
NONHDLC SERPL-MCNC: 150 MG/DL
NRBC BLD AUTO-RTO: 0 /100 WBCS
PLATELET # BLD AUTO: 265 THOUSANDS/UL (ref 149–390)
PMV BLD AUTO: 11.9 FL (ref 8.9–12.7)
RBC # BLD AUTO: 4.48 MILLION/UL (ref 3.81–5.12)
TRIGL SERPL-MCNC: 62 MG/DL
WBC # BLD AUTO: 6.59 THOUSAND/UL (ref 4.31–10.16)

## 2024-08-28 PROCEDURE — 80061 LIPID PANEL: CPT

## 2024-08-28 PROCEDURE — 87389 HIV-1 AG W/HIV-1&-2 AB AG IA: CPT

## 2024-08-28 PROCEDURE — 85025 COMPLETE CBC W/AUTO DIFF WBC: CPT

## 2024-08-28 PROCEDURE — 80074 ACUTE HEPATITIS PANEL: CPT

## 2024-08-28 PROCEDURE — 82728 ASSAY OF FERRITIN: CPT

## 2024-08-28 PROCEDURE — 36415 COLL VENOUS BLD VENIPUNCTURE: CPT

## 2024-08-29 LAB
HAV IGM SER QL: NORMAL
HBV CORE IGM SER QL: NORMAL
HBV SURFACE AG SER QL: NORMAL
HCV AB SER QL: NORMAL
HIV 1+2 AB+HIV1 P24 AG SERPL QL IA: NORMAL
HIV 2 AB SERPL QL IA: NORMAL
HIV1 AB SERPL QL IA: NORMAL
HIV1 P24 AG SERPL QL IA: NORMAL